# Patient Record
Sex: FEMALE | Race: WHITE
[De-identification: names, ages, dates, MRNs, and addresses within clinical notes are randomized per-mention and may not be internally consistent; named-entity substitution may affect disease eponyms.]

---

## 2017-01-21 ENCOUNTER — HOSPITAL ENCOUNTER (EMERGENCY)
Dept: HOSPITAL 71 - ER | Age: 27
Discharge: LEFT BEFORE DISCHARGE | End: 2017-01-21
Payer: MEDICAID

## 2017-01-21 DIAGNOSIS — Z53.21: Primary | ICD-10-CM

## 2018-11-02 ENCOUNTER — CONVERSION ENCOUNTER (OUTPATIENT)
Dept: PODIATRY | Facility: CLINIC | Age: 28
End: 2018-11-02

## 2018-11-02 ENCOUNTER — OFFICE VISIT CONVERTED (OUTPATIENT)
Dept: PODIATRY | Facility: CLINIC | Age: 28
End: 2018-11-02
Attending: PODIATRIST

## 2021-05-16 VITALS
HEART RATE: 85 BPM | WEIGHT: 234 LBS | HEIGHT: 67 IN | SYSTOLIC BLOOD PRESSURE: 140 MMHG | DIASTOLIC BLOOD PRESSURE: 77 MMHG | OXYGEN SATURATION: 100 % | BODY MASS INDEX: 36.73 KG/M2

## 2023-03-16 ENCOUNTER — HOSPITAL ENCOUNTER (EMERGENCY)
Facility: HOSPITAL | Age: 33
Discharge: HOME | End: 2023-03-16
Attending: EMERGENCY MEDICINE | Admitting: EMERGENCY MEDICINE
Payer: COMMERCIAL

## 2023-03-16 VITALS
HEIGHT: 66 IN | WEIGHT: 230 LBS | TEMPERATURE: 98.7 F | SYSTOLIC BLOOD PRESSURE: 151 MMHG | OXYGEN SATURATION: 99 % | DIASTOLIC BLOOD PRESSURE: 96 MMHG | HEART RATE: 79 BPM | BODY MASS INDEX: 36.96 KG/M2 | RESPIRATION RATE: 20 BRPM

## 2023-03-16 DIAGNOSIS — M10.9 ACUTE GOUT OF RIGHT FOOT, UNSPECIFIED CAUSE: Primary | ICD-10-CM

## 2023-03-16 LAB
BASOPHILS # BLD AUTO: 0.07 10*3/MM3 (ref 0–0.2)
BASOPHILS NFR BLD AUTO: 0.5 % (ref 0–1.5)
CRP SERPL-MCNC: 0.66 MG/DL (ref 0–0.5)
DEPRECATED RDW RBC AUTO: 41.8 FL (ref 37–54)
EOSINOPHIL # BLD AUTO: 0.27 10*3/MM3 (ref 0–0.4)
EOSINOPHIL NFR BLD AUTO: 2 % (ref 0.3–6.2)
ERYTHROCYTE [DISTWIDTH] IN BLOOD BY AUTOMATED COUNT: 12.8 % (ref 12.3–15.4)
ERYTHROCYTE [SEDIMENTATION RATE] IN BLOOD: 2 MM/HR (ref 0–20)
HBA1C MFR BLD: 5.5 % (ref 4.8–5.6)
HCT VFR BLD AUTO: 42.6 % (ref 34–46.6)
HGB BLD-MCNC: 14.6 G/DL (ref 12–15.9)
IMM GRANULOCYTES # BLD AUTO: 0.03 10*3/MM3 (ref 0–0.05)
IMM GRANULOCYTES NFR BLD AUTO: 0.2 % (ref 0–0.5)
LYMPHOCYTES # BLD AUTO: 2.66 10*3/MM3 (ref 0.7–3.1)
LYMPHOCYTES NFR BLD AUTO: 19.3 % (ref 19.6–45.3)
MCH RBC QN AUTO: 30.9 PG (ref 26.6–33)
MCHC RBC AUTO-ENTMCNC: 34.3 G/DL (ref 31.5–35.7)
MCV RBC AUTO: 90.1 FL (ref 79–97)
MONOCYTES # BLD AUTO: 0.65 10*3/MM3 (ref 0.1–0.9)
MONOCYTES NFR BLD AUTO: 4.7 % (ref 5–12)
NEUTROPHILS NFR BLD AUTO: 10.12 10*3/MM3 (ref 1.7–7)
NEUTROPHILS NFR BLD AUTO: 73.3 % (ref 42.7–76)
NRBC BLD AUTO-RTO: 0 /100 WBC (ref 0–0.2)
PLATELET # BLD AUTO: 209 10*3/MM3 (ref 140–450)
PMV BLD AUTO: 9.7 FL (ref 6–12)
RBC # BLD AUTO: 4.73 10*6/MM3 (ref 3.77–5.28)
URATE SERPL-MCNC: 6.7 MG/DL (ref 2.4–5.7)
WBC NRBC COR # BLD: 13.8 10*3/MM3 (ref 3.4–10.8)

## 2023-03-16 PROCEDURE — 96372 THER/PROPH/DIAG INJ SC/IM: CPT

## 2023-03-16 PROCEDURE — 25010000002 KETOROLAC TROMETHAMINE PER 15 MG

## 2023-03-16 PROCEDURE — 99283 EMERGENCY DEPT VISIT LOW MDM: CPT

## 2023-03-16 PROCEDURE — 85652 RBC SED RATE AUTOMATED: CPT

## 2023-03-16 PROCEDURE — 36415 COLL VENOUS BLD VENIPUNCTURE: CPT

## 2023-03-16 PROCEDURE — 85025 COMPLETE CBC W/AUTO DIFF WBC: CPT

## 2023-03-16 PROCEDURE — 83036 HEMOGLOBIN GLYCOSYLATED A1C: CPT

## 2023-03-16 PROCEDURE — 86140 C-REACTIVE PROTEIN: CPT

## 2023-03-16 PROCEDURE — 84550 ASSAY OF BLOOD/URIC ACID: CPT

## 2023-03-16 RX ORDER — KETOROLAC TROMETHAMINE 30 MG/ML
30 INJECTION, SOLUTION INTRAMUSCULAR; INTRAVENOUS ONCE
Status: COMPLETED | OUTPATIENT
Start: 2023-03-16 | End: 2023-03-16

## 2023-03-16 RX ORDER — NAPROXEN 500 MG/1
500 TABLET ORAL 2 TIMES DAILY PRN
Qty: 20 TABLET | Refills: 0 | Status: SHIPPED | OUTPATIENT
Start: 2023-03-16

## 2023-03-16 RX ORDER — PREDNISONE 20 MG/1
20 TABLET ORAL DAILY
Qty: 5 TABLET | Refills: 0 | Status: SHIPPED | OUTPATIENT
Start: 2023-03-16

## 2023-03-16 RX ADMIN — KETOROLAC TROMETHAMINE 30 MG: 30 INJECTION, SOLUTION INTRAMUSCULAR; INTRAVENOUS at 10:35

## 2023-03-16 NOTE — ED PROVIDER NOTES
Time: 10:34 AM EDT  Date of encounter:  3/16/2023  Independent Historian/Clinical History and Information was obtained by:   Patient  Chief Complaint: Right foot pain    History is limited by: N/A    History of Present Illness:  Patient is a 32 y.o. year old female who presents to the emergency department for evaluation of right foot pain.  Patient states she began having worsening right foot pain for the past 2 days.  Patient states she has been intermittently dealing with right foot pain for many years.  Patient denies any injury.  Patient states she had the right foot x-rayed many times and has no fracture.  Patient states she is concerned that she has gout.  Patient also admits to intermittent numbness and tingling in her bilateral feet.  Patient states that she was told approximately 5 years ago that she is prediabetic but no longer has a primary care provider.  Patient states the pain is worse with weightbearing.      History provided by:  Patient   used: No    Foot Pain  Severity:  Mild  Onset quality:  Gradual  Duration: 2 to 3 days.  Timing:  Intermittent  Chronicity:  Recurrent  Associated symptoms: no fever        Patient Care Team  Primary Care Provider: ProviderRadha Known    Past Medical History:     Allergies   Allergen Reactions   • Amoxicillin Itching     Past Medical History:   Diagnosis Date   • Diabetes mellitus (HCC)    • Hypertension    • PCOS (polycystic ovarian syndrome)      Past Surgical History:   Procedure Laterality Date   • WISDOM TOOTH EXTRACTION       History reviewed. No pertinent family history.    Home Medications:  Prior to Admission medications    Medication Sig Start Date End Date Taking? Authorizing Provider   CLINDAMYCIN HCL PO Take  by mouth.    Yang Veloz MD   Ibuprofen (MOTRIN PO) Take  by mouth.    Yang Veloz MD        Social History:   Social History     Tobacco Use   • Smoking status: Every Day     Packs/day: 0.50     Types:  "Cigarettes   • Smokeless tobacco: Never   Substance Use Topics   • Alcohol use: Yes     Comment: social         Review of Systems:  Review of Systems   Constitutional: Negative for fever.   Musculoskeletal: Positive for arthralgias and joint swelling.   Skin: Negative for wound.   All other systems reviewed and are negative.       Physical Exam:  /96 (BP Location: Right arm)   Pulse 79   Temp 98.7 °F (37.1 °C) (Oral)   Resp 20   Ht 167.6 cm (66\")   Wt 104 kg (230 lb)   LMP  (LMP Unknown)   SpO2 99%   BMI 37.12 kg/m²     Physical Exam  Vitals and nursing note reviewed.   Constitutional:       General: She is not in acute distress.     Appearance: Normal appearance. She is not ill-appearing, toxic-appearing or diaphoretic.   HENT:      Head: Normocephalic and atraumatic.      Nose: Nose normal.   Eyes:      Extraocular Movements: Extraocular movements intact.      Conjunctiva/sclera: Conjunctivae normal.      Pupils: Pupils are equal, round, and reactive to light.   Pulmonary:      Effort: Pulmonary effort is normal.   Abdominal:      General: Abdomen is flat. There is no distension.   Musculoskeletal:         General: Normal range of motion.      Cervical back: Normal range of motion and neck supple.      Comments: Right foot: Normal range of motion of the right foot and ankle.  There is moderate swelling noted distally to the lateral ankle.  Area of swelling is warm to the touch.  No ecchymosis.  Normal dorsalis pedis pulse.  Neurovascular intact.   Skin:     General: Skin is warm and dry.   Neurological:      General: No focal deficit present.      Mental Status: She is alert and oriented to person, place, and time.   Psychiatric:         Mood and Affect: Mood normal.         Behavior: Behavior normal.         Thought Content: Thought content normal.         Judgment: Judgment normal.                  Procedures:  Procedures      Medical Decision Making:    Comorbidities that affect care:    Diabetes, " Hypertension    External Notes reviewed:    Previous Clinic Note: Podiatry note from 11-2-2018      The following orders were placed and all results were independently analyzed by me:  Orders Placed This Encounter   Procedures   • C-reactive Protein   • Sedimentation Rate   • Uric Acid   • Hemoglobin A1c   • CBC Auto Differential   • CBC & Differential       Medications Given in the Emergency Department:  Medications   ketorolac (TORADOL) injection 30 mg (30 mg Intramuscular Given 3/16/23 1035)        ED Course:    ED Course as of 03/16/23 1157   u Mar 16, 2023   1037 I discussed potential x-ray with the patient however she states there is no known injury and she has had the foot x-rayed many times in the past [MD]   1043 Patient was provided with handout of primary care providers in the area at this time.  Patient was instructed that she needs to establish a new primary care provider.  Patient was also informed that the hemoglobin A1c that was ordered today will not result at today's visit and this can be followed up with primary care and she can also see the results on her MyChart [MD]   1154 Uric Acid(!): 6.7 [MD]   1155 C-Reactive Protein(!): 0.66 [MD]      ED Course User Index  [MD] Adarsh Wagner, TANVI       Labs:    Lab Results (last 24 hours)     Procedure Component Value Units Date/Time    CBC & Differential [[id number]]  (Abnormal) Collected: 03/16/23 1042    Specimen: Blood Updated: 03/16/23 1049    Narrative:      The following orders were created for panel order CBC & Differential.  Procedure                               Abnormality         Status                     ---------                               -----------         ------                     CBC Auto Differential[[id number]]        Abnormal            Final result                 Please view results for these tests on the individual orders.    C-reactive Protein [[id number]]  (Abnormal) Collected: 03/16/23 1042    Specimen: Blood Updated:  03/16/23 1148     C-Reactive Protein 0.66 mg/dL     Sedimentation Rate [[id number]]  (Normal) Collected: 03/16/23 1042    Specimen: Blood Updated: 03/16/23 1053     Sed Rate 2 mm/hr     Uric Acid [[id number]]  (Abnormal) Collected: 03/16/23 1042    Specimen: Blood Updated: 03/16/23 1148     Uric Acid 6.7 mg/dL     Hemoglobin A1c [[id number]] Collected: 03/16/23 1042    Specimen: Blood Updated: 03/16/23 1044    CBC Auto Differential [[id number]]  (Abnormal) Collected: 03/16/23 1042    Specimen: Blood Updated: 03/16/23 1049     WBC 13.80 10*3/mm3      RBC 4.73 10*6/mm3      Hemoglobin 14.6 g/dL      Hematocrit 42.6 %      MCV 90.1 fL      MCH 30.9 pg      MCHC 34.3 g/dL      RDW 12.8 %      RDW-SD 41.8 fl      MPV 9.7 fL      Platelets 209 10*3/mm3      Neutrophil % 73.3 %      Lymphocyte % 19.3 %      Monocyte % 4.7 %      Eosinophil % 2.0 %      Basophil % 0.5 %      Immature Grans % 0.2 %      Neutrophils, Absolute 10.12 10*3/mm3      Lymphocytes, Absolute 2.66 10*3/mm3      Monocytes, Absolute 0.65 10*3/mm3      Eosinophils, Absolute 0.27 10*3/mm3      Basophils, Absolute 0.07 10*3/mm3      Immature Grans, Absolute 0.03 10*3/mm3      nRBC 0.0 /100 WBC            Imaging:    No Radiology Exams Resulted Within Past 24 Hours      Differential Diagnosis and Discussion:    Joint Pain: Differential diagnosis includes but is not limited to polyarticular arthritis, gout, tendinitis, hemarthrosis, septic arthritis, rheumatoid arthritis, bursitis, degenerative joint disease, joint effusion, autoimmune disorder, trauma, and occult neoplasm.    All labs were reviewed and interpreted by me.    MDM  Number of Diagnoses or Management Options  Diagnosis management comments: Patient presented to the emergency department for evaluation of recurrent right foot pain.  CBC is noted to have mild elevation of white blood cell count of 13.8.  Uric acid and CRP are noted to be elevated.  Sed rate is normal.  Hemoglobin A1c is still pending.   I will treat patient for acute gout flare and have her establish PCP for follow-up.       Amount and/or Complexity of Data Reviewed  Clinical lab tests: reviewed and ordered    Risk of Complications, Morbidity, and/or Mortality  Presenting problems: moderate  Diagnostic procedures: low  Management options: low    Patient Progress  Patient progress: stable         Patient Care Considerations:    NARCOTICS: I considered prescribing opiate pain medication as an outpatient, however Symptoms are most consistent with gout flare which will improve with anti-inflammatory medication      Consultants/Shared Management Plan:    None    Social Determinants of Health:    Patient is independent, reliable, and has access to care.       Disposition and Care Coordination:    Discharged: The patient is suitable and stable for discharge with no need for consideration of observation or admission.    I have explained the patient´s condition, diagnoses and treatment plan based on the information available to me at this time. I have answered questions and addressed any concerns. The patient has a good  understanding of the patient´s diagnosis, condition, and treatment plan as can be expected at this point. The vital signs have been stable. The patient´s condition is stable and appropriate for discharge from the emergency department.      The patient will pursue further outpatient evaluation with the primary care physician or other designated or consulting physician as outlined in the discharge instructions. They are agreeable to this plan of care and follow-up instructions have been explained in detail. The patient has received these instructions in written format and have expressed an understanding of the discharge instructions. The patient is aware that any significant change in condition or worsening of symptoms should prompt an immediate return to this or the closest emergency department or call to 911.  I have explained discharge  medications and the need for follow up with the patient/caretakers. This was also printed in the discharge instructions. Patient was discharged with the following medications and follow up:      Medication List      New Prescriptions    naproxen 500 MG tablet  Commonly known as: NAPROSYN  Take 1 tablet by mouth 2 (Two) Times a Day As Needed for Mild Pain.     predniSONE 20 MG tablet  Commonly known as: DELTASONE  Take 1 tablet by mouth Daily.           Where to Get Your Medications      These medications were sent to [facility]/pharmacy #[id number] - [place], [place] - [address] - [phone] [facility] [phone]   [address] [place] [address]    Hours: 24-hours Phone: [phone]   · naproxen 500 MG tablet  · predniSONE 20 MG tablet      No follow-up provider specified.     Final diagnoses:   Acute gout of right foot, unspecified cause        ED Disposition     ED Disposition   Discharge    Condition   Stable    Comment   --             This medical record created using voice recognition software.           Adarsh Wagner PA-C  03/16/23 7631

## 2023-03-16 NOTE — DISCHARGE INSTRUCTIONS
Please take the medications as prescribed.  Applying warm compresses 15 to 20 minutes at a time may also improve your pain and you can do this 4-5 times a day.  Please establish a primary care provider for follow-up.  I did check your hemoglobin A1c to determine if you are diabetic however this has not resulted.  The results of this can be seen in your MyChart.

## 2023-04-20 ENCOUNTER — OFFICE VISIT (OUTPATIENT)
Dept: PODIATRY | Facility: CLINIC | Age: 33
End: 2023-04-20
Payer: COMMERCIAL

## 2023-04-20 VITALS
HEIGHT: 66 IN | TEMPERATURE: 98.6 F | SYSTOLIC BLOOD PRESSURE: 127 MMHG | DIASTOLIC BLOOD PRESSURE: 85 MMHG | BODY MASS INDEX: 37.77 KG/M2 | HEART RATE: 75 BPM | OXYGEN SATURATION: 97 % | WEIGHT: 235 LBS

## 2023-04-20 DIAGNOSIS — M79.2 NERVE PAIN: ICD-10-CM

## 2023-04-20 DIAGNOSIS — R22.41 MASS OF RIGHT ANKLE: Primary | ICD-10-CM

## 2023-04-20 RX ORDER — ALLOPURINOL 100 MG/1
TABLET ORAL
COMMUNITY
Start: 2023-03-28

## 2023-04-20 RX ORDER — MELOXICAM 15 MG/1
15 TABLET ORAL DAILY
Qty: 30 TABLET | Refills: 0 | Status: SHIPPED | OUTPATIENT
Start: 2023-04-20

## 2023-04-20 RX ORDER — COLCHICINE 0.6 MG/1
TABLET ORAL
COMMUNITY
Start: 2023-03-28

## 2023-04-20 NOTE — PROGRESS NOTES
[facility] - PODIATRY    Today's Date: 04/20/23    Patient Name: Crystal June  MRN: [id number]  CSN: [id number]  PCP: Katie Hassan MD,   Referring Provider: Katie Hassan MD    SUBJECTIVE     Chief Complaint   Patient presents with   • Right Foot - Edema, Pain, Establish Care     Patient reports she suspects she has gout as her uric acid is elevated. Patient's PCP gave her allopurinol and colchicine, and prednisone for treatment. Pt states the medicines helped for the first couple days starting 3/28/2023. States when the prednisone was finished her flare up with pain and swelling started again.    • Left Foot - Pain     HPI: Crystal June, a 32 y.o.female, presents to clinic.    Patient is a 32-year-old female presenting with right ankle pain.  Patient states it hurts on the right ankle on the top of the foot.  It is swollen in this area compared to the left foot.  Patient states it hurts all the time it did improve with the Medrol Dosepak but as soon as she stopped taking it the pain came back.  She also has a complaint of shooting pain in both lower extremities.  Patient states that it is painful at night and keeps her up, but when she gets up and walks around it gets better.    Past Medical History:   Diagnosis Date   • Diabetes mellitus    • Difficulty walking Three or more years ago this flairs up every month or 2   • Gout 03/16/2023    Uric acid was high at the emergency room, and I couldn't walk on my foot at all. They gave me a steroid and anti inflammatory. After the steroid ran out (5 days) Then I went to the primary doctor and she started me on medication for gout after....   • Hypertension    • PCOS (polycystic ovarian syndrome)      Past Surgical History:   Procedure Laterality Date   • WISDOM TOOTH EXTRACTION       Family History   Problem Relation Age of Onset   • Hypertension Mother    • Diabetes Father    • Cancer Maternal Grandfather         Lung   • Diabetes Maternal  Grandfather    • Hypertension Maternal Grandfather    • Diabetes Maternal Grandmother    • Hypertension Maternal Grandmother    • Diabetes Paternal Grandmother      Social History     Socioeconomic History   • Marital status:    Tobacco Use   • Smoking status: Every Day     Packs/day: 0.50     Years: 17.00     Pack years: 8.50     Types: Cigarettes     Start date: 2006   • Smokeless tobacco: Never   Vaping Use   • Vaping Use: Never used   Substance and Sexual Activity   • Alcohol use: Yes     Alcohol/week: 12.0 standard drinks     Types: 12 Cans of beer per week     Comment: social   • Drug use: Never   • Sexual activity: Yes     Partners: Female, Male     Allergies   Allergen Reactions   • Amoxicillin Itching     Current Outpatient Medications   Medication Sig Dispense Refill   • allopurinol (ZYLOPRIM) 100 MG tablet TAKE 1 TABLET BY MOUTH DAILY FOR 1 WEEK THEN 2 TABLETS DAILY THEREAFTER     • colchicine 0.6 MG tablet TAKE 2 TABLETS BY MOUTH AS 1 DOSE ON DAY 1, THEN 1 TABLET TWICE DAILY THEREAFTER     • CLINDAMYCIN HCL PO Take  by mouth. (Patient not taking: Reported on 4/20/2023)     • meloxicam (MOBIC) 15 MG tablet Take 1 tablet by mouth Daily. Take once daily. 30 tablet 0   • naproxen (NAPROSYN) 500 MG tablet Take 1 tablet by mouth 2 (Two) Times a Day As Needed for Mild Pain. 20 tablet 0   • predniSONE (DELTASONE) 20 MG tablet Take 1 tablet by mouth Daily. 5 tablet 0     No current facility-administered medications for this visit.     Review of Systems   Musculoskeletal:        Pain to right ankle   Neurological:        Shooting pain in feet       OBJECTIVE     Vitals:    04/20/23 0736   BP: 127/85   Pulse: 75   Temp: 98.6 °F (37 °C)   SpO2: 97%       WBC   Date Value Ref Range Status   03/16/2023 13.80 (H) 3.40 - 10.80 10*3/mm3 Final     RBC   Date Value Ref Range Status   03/16/2023 4.73 3.77 - 5.28 10*6/mm3 Final     Hemoglobin   Date Value Ref Range Status   03/16/2023 14.6 12.0 - 15.9 g/dL Final      Hematocrit   Date Value Ref Range Status   03/16/2023 42.6 34.0 - 46.6 % Final     MCV   Date Value Ref Range Status   03/16/2023 90.1 79.0 - 97.0 fL Final     MCH   Date Value Ref Range Status   03/16/2023 30.9 26.6 - 33.0 pg Final     MCHC   Date Value Ref Range Status   03/16/2023 34.3 31.5 - 35.7 g/dL Final     RDW   Date Value Ref Range Status   03/16/2023 12.8 12.3 - 15.4 % Final     RDW-SD   Date Value Ref Range Status   03/16/2023 41.8 37.0 - 54.0 fl Final     MPV   Date Value Ref Range Status   03/16/2023 9.7 6.0 - 12.0 fL Final     Platelets   Date Value Ref Range Status   03/16/2023 209 140 - 450 10*3/mm3 Final     Neutrophil %   Date Value Ref Range Status   03/16/2023 73.3 42.7 - 76.0 % Final     Lymphocyte %   Date Value Ref Range Status   03/16/2023 19.3 (L) 19.6 - 45.3 % Final     Monocyte %   Date Value Ref Range Status   03/16/2023 4.7 (L) 5.0 - 12.0 % Final     Eosinophil %   Date Value Ref Range Status   03/16/2023 2.0 0.3 - 6.2 % Final     Basophil %   Date Value Ref Range Status   03/16/2023 0.5 0.0 - 1.5 % Final     Immature Grans %   Date Value Ref Range Status   03/16/2023 0.2 0.0 - 0.5 % Final     Neutrophils, Absolute   Date Value Ref Range Status   03/16/2023 10.12 (H) 1.70 - 7.00 10*3/mm3 Final     Lymphocytes, Absolute   Date Value Ref Range Status   03/16/2023 2.66 0.70 - 3.10 10*3/mm3 Final     Monocytes, Absolute   Date Value Ref Range Status   03/16/2023 0.65 0.10 - 0.90 10*3/mm3 Final     Eosinophils, Absolute   Date Value Ref Range Status   03/16/2023 0.27 0.00 - 0.40 10*3/mm3 Final     Basophils, Absolute   Date Value Ref Range Status   03/16/2023 0.07 0.00 - 0.20 10*3/mm3 Final     Immature Grans, Absolute   Date Value Ref Range Status   03/16/2023 0.03 0.00 - 0.05 10*3/mm3 Final     nRBC   Date Value Ref Range Status   03/16/2023 0.0 0.0 - 0.2 /100 WBC Final         No results found for: GLUCOSE, BUN, CREATININE, EGFRIFNONA, EGFRIFAFRI, BCR, K, CO2, CALCIUM, PROTENTOTREF,  ALBUMIN, LABIL2, BILIRUBIN, AST, ALT    Patient seen in no apparent distress.      PHYSICAL EXAM:     Foot/Ankle Exam    GENERAL  Appearance:  appears stated age  Orientation:  AAOx3  Affect:  appropriate  Gait:  unimpaired  Assistance:  independent  Right shoe gear: casual shoe  Left shoe gear: casual shoe    VASCULAR     Right Foot Vascularity   Normal vascular exam    Dorsalis pedis:  2+  Posterior tibial:  2+  Skin temperature:  warm  Edema grading:  None  CFT:  < 3 seconds  Pedal hair growth:  Present  Varicosities:  none     Left Foot Vascularity   Normal vascular exam    Dorsalis pedis:  2+  Posterior tibial:  2+  Skin temperature:  warm  Edema grading:  None  CFT:  < 3 seconds  Pedal hair growth:  Present  Varicosities:  none     NEUROLOGIC     Right Foot Neurologic   Normal sensation    Light touch sensation: normal  Vibratory sensation: normal  Hot/Cold sensation: normal     Left Foot Neurologic   Normal sensation    Light touch sensation: normal  Vibratory sensation: normal  Hot/Cold sensation:  normal    MUSCULOSKELETAL     Right Foot Musculoskeletal   Tenderness:  anterior ankle tenderness and anterior tibiotalar joint line tenderness      MUSCLE STRENGTH     Right Foot Muscle Strength   Foot dorsiflexion:  4  Foot plantar flexion:  4  Foot inversion:  4  Foot eversion:  4     Left Foot Muscle Strength   Foot dorsiflexion:  4  Foot plantar flexion:  4  Foot inversion:  4  Foot eversion:  4    RANGE OF MOTION     Right Foot Range of Motion   Foot and ankle ROM within normal limits       Left Foot Range of Motion   Foot and ankle ROM within normal limits      DERMATOLOGIC      Right Foot Dermatologic   Skin  Right foot skin is intact. (Swelling and mass to right anterior ankle at the TN joint, tender on palpation)     Left Foot Dermatologic   Skin  Left foot skin is intact.       RADIOLOGY:        No results found.    ASSESSMENT/PLAN     Diagnoses and all orders for this visit:    1. Mass of right ankle  (Primary)  -     MRI Ankle Right Without Contrast; Future    2. Nerve pain    Other orders  -     meloxicam (MOBIC) 15 MG tablet; Take 1 tablet by mouth Daily. Take once daily.  Dispense: 30 tablet; Refill: 0    Patient with possible mass to right anterior ankle.  Likely cause of her discomfort and limited mobility suspect possible ganglion cyst.    Patient likely has a component of radiculopathy as well due to the sharp shooting pain in both lower extremities.    We will get an MRI to evaluate the right ankle.    Meloxicam prescribed    Return to clinic following MRI.    Comprehensive lower extremity examination and evaluation was performed.    Discussed findings and treatment plan including risks, benefits, and treatment options with patient in detail. Patient agreed with treatment plan.    Medications and allergies reviewed.  Reviewed available lab values along with other pertinent labs.  These were discussed with the patient.    An After Visit Summary was printed and given to the patient at discharge, including (if requested) any available informative/educational handouts regarding diagnosis, treatment, or medications. All questions were answered to patient/family satisfaction. Should symptoms fail to improve or worsen they agree to call or return to clinic or to go to the Emergency Department. Discussed the importance of following up with any needed screening tests/labs/specialist appointments and any requested follow-up recommended by me today. Importance of maintaining follow-up discussed and patient accepts that missed appointments can delay diagnosis and potentially lead to worsening of conditions.    Return in about 1 month (around 5/20/2023)., or sooner if acute issues arise.    This document has been electronically signed by Dorian Mcdaniels DPM on April 20, 2023 09:06 EDT

## 2023-05-10 ENCOUNTER — HOSPITAL ENCOUNTER (EMERGENCY)
Facility: HOSPITAL | Age: 33
Discharge: HOME | End: 2023-05-10
Attending: EMERGENCY MEDICINE | Admitting: EMERGENCY MEDICINE
Payer: COMMERCIAL

## 2023-05-10 VITALS
DIASTOLIC BLOOD PRESSURE: 79 MMHG | BODY MASS INDEX: 36.96 KG/M2 | TEMPERATURE: 98 F | OXYGEN SATURATION: 98 % | WEIGHT: 230 LBS | HEIGHT: 66 IN | HEART RATE: 58 BPM | SYSTOLIC BLOOD PRESSURE: 120 MMHG | RESPIRATION RATE: 20 BRPM

## 2023-05-10 DIAGNOSIS — M79.671 RIGHT FOOT PAIN: Primary | ICD-10-CM

## 2023-05-10 PROCEDURE — 96372 THER/PROPH/DIAG INJ SC/IM: CPT

## 2023-05-10 PROCEDURE — 99283 EMERGENCY DEPT VISIT LOW MDM: CPT

## 2023-05-10 PROCEDURE — 25010000002 DEXAMETHASONE SODIUM PHOSPHATE 10 MG/ML SOLUTION: Performed by: BEHAVIOR TECHNICIAN

## 2023-05-10 RX ORDER — PREDNISONE 20 MG/1
20 TABLET ORAL DAILY
Qty: 5 TABLET | Refills: 0 | Status: SHIPPED | OUTPATIENT
Start: 2023-05-10 | End: 2023-05-15

## 2023-05-10 RX ORDER — DEXAMETHASONE SODIUM PHOSPHATE 10 MG/ML
10 INJECTION, SOLUTION INTRAMUSCULAR; INTRAVENOUS ONCE
Status: COMPLETED | OUTPATIENT
Start: 2023-05-10 | End: 2023-05-10

## 2023-05-10 RX ORDER — IBUPROFEN 400 MG/1
800 TABLET ORAL ONCE
Status: COMPLETED | OUTPATIENT
Start: 2023-05-10 | End: 2023-05-10

## 2023-05-10 RX ADMIN — IBUPROFEN 800 MG: 400 TABLET, FILM COATED ORAL at 23:19

## 2023-05-10 RX ADMIN — DEXAMETHASONE SODIUM PHOSPHATE 10 MG: 10 INJECTION INTRAMUSCULAR; INTRAVENOUS at 23:19

## 2023-05-10 NOTE — Clinical Note
[facility] EMERGENCY ROOM  [address]  [place] [address]  Phone: [phone]    Crystal June was seen and treated in our emergency department on 5/10/2023.  She may return to work on 05/11/2023.         Thank you for choosing [facility].    Shabbir, Yusra, PA

## 2023-05-11 NOTE — DISCHARGE INSTRUCTIONS
Keep your appointment with your appointment for MRI from May 22.  Follow-up with PCP in 3 days if symptoms persist.  Take steroids as prescribed course.  Take ibuprofen or Tylenol as needed for pain and swelling.  Rest, ice, elevate right foot.  Return to the emergency department if you have increasing severity of pain, shortness of air, loss of consciousness.

## 2023-05-11 NOTE — ED PROVIDER NOTES
"Patient is 33 y.o. year old female that presents to the ED for evaluation of leg pain.     Physical Exam    ED Course:    /79   Pulse 63   Temp 98 °F (36.7 °C)   Resp 20   Ht 167.6 cm (66\")   Wt 104 kg (230 lb)   SpO2 96%   BMI 37.12 kg/m²   Results for orders placed or performed during the hospital encounter of 03/16/23   C-reactive Protein    Specimen: Blood   Result Value Ref Range    C-Reactive Protein 0.66 (H) 0.00 - 0.50 mg/dL   Sedimentation Rate    Specimen: Blood   Result Value Ref Range    Sed Rate 2 0 - 20 mm/hr   Uric Acid    Specimen: Blood   Result Value Ref Range    Uric Acid 6.7 (H) 2.4 - 5.7 mg/dL   Hemoglobin A1c    Specimen: Blood   Result Value Ref Range    Hemoglobin A1C 5.50 4.80 - 5.60 %   CBC Auto Differential    Specimen: Blood   Result Value Ref Range    WBC 13.80 (H) 3.40 - 10.80 10*3/mm3    RBC 4.73 3.77 - 5.28 10*6/mm3    Hemoglobin 14.6 12.0 - 15.9 g/dL    Hematocrit 42.6 34.0 - 46.6 %    MCV 90.1 79.0 - 97.0 fL    MCH 30.9 26.6 - 33.0 pg    MCHC 34.3 31.5 - 35.7 g/dL    RDW 12.8 12.3 - 15.4 %    RDW-SD 41.8 37.0 - 54.0 fl    MPV 9.7 6.0 - 12.0 fL    Platelets 209 140 - 450 10*3/mm3    Neutrophil % 73.3 42.7 - 76.0 %    Lymphocyte % 19.3 (L) 19.6 - 45.3 %    Monocyte % 4.7 (L) 5.0 - 12.0 %    Eosinophil % 2.0 0.3 - 6.2 %    Basophil % 0.5 0.0 - 1.5 %    Immature Grans % 0.2 0.0 - 0.5 %    Neutrophils, Absolute 10.12 (H) 1.70 - 7.00 10*3/mm3    Lymphocytes, Absolute 2.66 0.70 - 3.10 10*3/mm3    Monocytes, Absolute 0.65 0.10 - 0.90 10*3/mm3    Eosinophils, Absolute 0.27 0.00 - 0.40 10*3/mm3    Basophils, Absolute 0.07 0.00 - 0.20 10*3/mm3    Immature Grans, Absolute 0.03 0.00 - 0.05 10*3/mm3    nRBC 0.0 0.0 - 0.2 /100 WBC     Medications   dexamethasone sodium phosphate injection 10 mg (has no administration in time range)     XR Ankle 3+ View Right    Result Date: 4/21/2023  Narrative: IN-OFFICE IMAGING:   Standing, weightbearing, 3 view, AP, MO, Lateral, right ankle " indication: Right ankle pain Findings: 3 views of the right ankle demonstrate no acute fracture dislocations.  Normal bone density.  No significant soft tissue edema. Comparison: No comparison views.        MDM:    Procedures      The case was discussed between the GURINDER and myself. Patient  care including, but not limited to ordered imaging, medications, and lab results were reviewed. I then performed the substantive portion of the visit including all aspects of the medical decision making.        Binh Romo MD  22:59 EDT  05/10/23       Binh Romo MD  05/10/23 [id number]

## 2023-05-11 NOTE — ED PROVIDER NOTES
Time: 8:24 PM EDT  Date of encounter:  5/10/2023  Independent Historian/Clinical History and Information was obtained by:   Patient  Chief Complaint   Patient presents with   • Back Pain     Pt reports being intermittently treated for nerve pain shooting down through R leg and foot. MRI scheduled for 5/22       History is limited by: N/A    History of Present Illness:  Patient is a 33 y.o. year old female who presents to the emergency department for evaluation of right lower extremity burning pain that is 6 out of 10.  She said it feels like her foot is on fire.  She also reports swelling around her right ankle.  She said has been doing this off and on instantly when it wants to for about a year.  She saw a podiatrist who thinks it may be related to her back.  She says she has an appointment for an MRI on 22 May but it has flared up again before she can even get to the MRI appointment. (Libia Gentile)     Patient is complaining of right foot pain that is located on the top of her foot.  Patient states the pain radiates up her right lower extremity on the posterior aspect all the way up to her lower back.  Patient states that she has been dealing with this for a while now and only gets relief from steroids.    Patient denies fever, chills, saddle anesthesia, bowel or bowel incontinence.    HPI    Patient Care Team  Primary Care Provider: Katie Hassan MD    Past Medical History:     Allergies   Allergen Reactions   • Amoxicillin Itching     Past Medical History:   Diagnosis Date   • Diabetes mellitus    • Difficulty walking Three or more years ago this flairs up every month or 2   • Gout 03/16/2023    Uric acid was high at the emergency room, and I couldn't walk on my foot at all. They gave me a steroid and anti inflammatory. After the steroid ran out (5 days) Then I went to the primary doctor and she started me on medication for gout after....   • Hypertension    • PCOS (polycystic ovarian syndrome)      Past  Surgical History:   Procedure Laterality Date   • WISDOM TOOTH EXTRACTION       Family History   Problem Relation Age of Onset   • Hypertension Mother    • Diabetes Father    • Cancer Maternal Grandfather         Lung   • Diabetes Maternal Grandfather    • Hypertension Maternal Grandfather    • Diabetes Maternal Grandmother    • Hypertension Maternal Grandmother    • Diabetes Paternal Grandmother        Home Medications:  Prior to Admission medications    Medication Sig Start Date End Date Taking? Authorizing Provider   allopurinol (ZYLOPRIM) 100 MG tablet TAKE 1 TABLET BY MOUTH DAILY FOR 1 WEEK THEN 2 TABLETS DAILY THEREAFTER 3/28/23   Yang Veloz MD   CLINDAMYCIN HCL PO Take  by mouth.  Patient not taking: Reported on 4/20/2023    Yang Veloz MD   colchicine 0.6 MG tablet TAKE 2 TABLETS BY MOUTH AS 1 DOSE ON DAY 1, THEN 1 TABLET TWICE DAILY THEREAFTER 3/28/23   Yang Veloz MD   meloxicam (MOBIC) 15 MG tablet Take 1 tablet by mouth Daily. Take once daily. 4/20/23   Dorian Mcdaniels DPM   naproxen (NAPROSYN) 500 MG tablet Take 1 tablet by mouth 2 (Two) Times a Day As Needed for Mild Pain. 3/16/23   Adarsh Wagner PA-C   predniSONE (DELTASONE) 20 MG tablet Take 1 tablet by mouth Daily. 3/16/23   Adarsh Wagner PA-C        Social History:   Social History     Tobacco Use   • Smoking status: Every Day     Packs/day: 0.50     Years: 17.00     Pack years: 8.50     Types: Cigarettes     Start date: 2006   • Smokeless tobacco: Never   Vaping Use   • Vaping Use: Never used   Substance Use Topics   • Alcohol use: Yes     Alcohol/week: 12.0 standard drinks     Types: 12 Cans of beer per week     Comment: social   • Drug use: Never         Review of Systems:  Review of Systems   Constitutional: Negative for chills and fever.   Musculoskeletal: Positive for arthralgias, back pain and joint swelling.        Right lower extremity burning pain   Neurological: Negative for numbness.     "    Physical Exam:  /79   Pulse 58   Temp 98 °F (36.7 °C)   Resp 20   Ht 167.6 cm (66\")   Wt 104 kg (230 lb)   SpO2 98%   BMI 37.12 kg/m²     Physical Exam  Vitals and nursing note reviewed.   Constitutional:       General: She is not in acute distress.     Appearance: Normal appearance. She is not ill-appearing, toxic-appearing or diaphoretic.   HENT:      Head: Normocephalic and atraumatic.      Right Ear: Tympanic membrane normal.      Left Ear: Tympanic membrane normal.      Nose: Nose normal.      Mouth/Throat:      Mouth: Mucous membranes are moist.   Eyes:      Extraocular Movements: Extraocular movements intact.      Pupils: Pupils are equal, round, and reactive to light.   Cardiovascular:      Rate and Rhythm: Normal rate and regular rhythm.      Pulses: Normal pulses.      Heart sounds: Normal heart sounds.   Pulmonary:      Effort: Pulmonary effort is normal. No respiratory distress.      Breath sounds: Normal breath sounds. No wheezing.   Chest:      Chest wall: No tenderness.   Abdominal:      General: Abdomen is flat. Bowel sounds are normal. There is no distension.      Palpations: Abdomen is soft.      Tenderness: There is no guarding.   Musculoskeletal:         General: Normal range of motion.      Cervical back: Normal range of motion and neck supple.      Thoracic back: No swelling, deformity or tenderness. Normal range of motion.      Lumbar back: No swelling or tenderness. Normal range of motion. Negative right straight leg raise test and negative left straight leg raise test.      Right knee: No swelling or erythema. Normal range of motion. No tenderness.      Right lower leg: No tenderness.      Right ankle: No swelling. No tenderness. Normal range of motion. Normal pulse.      Right foot: Normal range of motion and normal capillary refill. No swelling, deformity or bunion. Normal pulse.      Comments: No step-off deformity.    Patient has no calf tenderness bilaterally.  DP are " 2+, equal bilaterally.  There is no swelling in right ankle, right foot, left lower extremity.   Skin:     General: Skin is warm and dry.   Neurological:      General: No focal deficit present.      Mental Status: She is alert and oriented to person, place, and time.   Psychiatric:         Mood and Affect: Mood normal.         Behavior: Behavior normal.                  Procedures:  Procedures      Medical Decision Making:      Comorbidities that affect care:    Diabetes, Hypertension    External Notes reviewed:    Previous Clinic Note: Patient most recent visit to podiatry on 4/20/2023 for mass of right ankle.      The following orders were placed and all results were independently analyzed by me:  No orders of the defined types were placed in this encounter.      Medications Given in the Emergency Department:  Medications   dexamethasone sodium phosphate injection 10 mg (10 mg Intramuscular Given 5/10/23 2319)   ibuprofen (ADVIL,MOTRIN) tablet 800 mg (800 mg Oral Given 5/10/23 2319)        ED Course:    The patient was initially evaluated in the triage area where orders were placed. The patient was later dispositioned by Yusra Shabbir, PA.      The patient was advised to stay for completion of workup which includes but is not limited to communication of labs and radiological results, reassessment and plan. The patient was advised that leaving prior to disposition by a provider could result in critical findings that are not communicated to the patient.     ED Course as of 05/11/23 0241   Wed May 10, 2023   2300 On reassessment patient states her symptoms is significantly improved. [YS]      ED Course User Index  [YS] Shabbir, Yusra, PA       Labs:    Lab Results (last 24 hours)     ** No results found for the last 24 hours. **           Imaging:    No Radiology Exams Resulted Within Past 24 Hours      Differential Diagnosis and Discussion:      Extremity Pain: Differential diagnosis includes but is not limited to  soft tissue sprain, tendonitis, tendon injury, dislocation, fracture, deep vein thrombosis, arterial insufficiency, osteoarthritis, bursitis, and ligamentous damage.        MDM  Number of Diagnoses or Management Options  Right foot pain  Diagnosis management comments: Patient reports emergency department complaining of right foot pain that radiates up her left lower extremity posteriorly.  Patient states she has had multiple episodes of similar pain that has been worked up with multiple x-rays of right foot and a consult to podiatry.  Patient states that she has an MRI set up for May 22.  On physical exams patient right lower extremity was unremarkable with no swelling, decreased range of motion, pulses equal and 2+.  Patient was mildly tender on the top of her right foot.  Patient was given a shot of Decadron in emergency department.  On reassessment patient states her symptoms have improved significantly.  Patient was discharged on a course of steroids.  Patient educated to keep her appointment for MRI on May 22 and follow-up with her PCP if symptoms persist.  Patient told to return for department if increase in swelling and pain of right foot, calf tenderness, change in color of left lower extremity, change in temperature of left lower extremity.    I have explained the patient´s condition, diagnoses and treatment plan based on the information available to me at this time. I have answered questions and addressed any concerns. The patient has a good  understanding of the patient´s diagnosis, condition, and treatment plan as can be expected at this point. The vital signs have been stable. The patient´s condition is stable and appropriate for discharge from the emergency department.      The patient will pursue further outpatient evaluation with the primary care physician or other designated or consulting physician as outlined in the discharge instructions. They are agreeable to this plan of care and follow-up  instructions have been explained in detail. The patient has received these instructions in written format and have expressed an understanding of the discharge instructions. The patient is aware that any significant change in condition or worsening of symptoms should prompt an immediate return to this or the closest emergency department or call to 911.             Patient Care Considerations:    I consider doing x-ray of right foot however patient states that she has had multiple x-rays of right foot and has been seen by podiatry regarding this same complaint multiple times with out any findings.  Patient says she is scheduled for MRI on May 22.      Consultants/Shared Management Plan:    None    Social Determinants of Health:    Patient is independent, reliable, and has access to care.       Disposition and Care Coordination:    Discharged: The patient is suitable and stable for discharge with no need for consideration of observation or admission.    I have explained the patient´s condition, diagnoses and treatment plan based on the information available to me at this time. I have answered questions and addressed any concerns. The patient has a good  understanding of the patient´s diagnosis, condition, and treatment plan as can be expected at this point. The vital signs have been stable. The patient´s condition is stable and appropriate for discharge from the emergency department.      The patient will pursue further outpatient evaluation with the primary care physician or other designated or consulting physician as outlined in the discharge instructions. They are agreeable to this plan of care and follow-up instructions have been explained in detail. The patient has received these instructions in written format and have expressed an understanding of the discharge instructions. The patient is aware that any significant change in condition or worsening of symptoms should prompt an immediate return to this or the  closest emergency department or call to 911.  I have explained discharge medications and the need for follow up with the patient/caretakers. This was also printed in the discharge instructions. Patient was discharged with the following medications and follow up:      Where to Get Your Medications      These medications were sent to [facility]/pharmacy #[id number] - [place], [place] - [address] - [phone]  - [phone]   [address] [place] [address]    Hours: 24-hours Phone: [phone]   · predniSONE 20 MG tablet        Medication List      No changes were made to your prescriptions during this visit.      Katie Hassan MD  [address] [id number] [phone]      If symptoms worsen    Katie Hassan MD  [facility] [id number] [phone]    In 3 days         Final diagnoses:   Right foot pain        ED Disposition     ED Disposition   Discharge    Condition   Stable    Comment   --             This medical record created using voice recognition software.           Shabbir, Yusra, PA  05/11/23 0243

## 2023-05-22 ENCOUNTER — HOSPITAL ENCOUNTER (OUTPATIENT)
Dept: MRI IMAGING | Facility: HOSPITAL | Age: 33
Discharge: HOME | End: 2023-05-22
Admitting: PODIATRIST
Payer: COMMERCIAL

## 2023-05-22 DIAGNOSIS — R22.41 MASS OF RIGHT ANKLE: ICD-10-CM

## 2023-05-22 PROCEDURE — 73721 MRI JNT OF LWR EXTRE W/O DYE: CPT
